# Patient Record
Sex: FEMALE | Race: WHITE
[De-identification: names, ages, dates, MRNs, and addresses within clinical notes are randomized per-mention and may not be internally consistent; named-entity substitution may affect disease eponyms.]

---

## 2018-05-10 ENCOUNTER — HOSPITAL ENCOUNTER (OUTPATIENT)
Dept: HOSPITAL 72 - SUR | Age: 53
Discharge: HOME | End: 2018-05-10
Payer: COMMERCIAL

## 2018-05-10 VITALS — SYSTOLIC BLOOD PRESSURE: 147 MMHG | DIASTOLIC BLOOD PRESSURE: 82 MMHG

## 2018-05-10 VITALS — SYSTOLIC BLOOD PRESSURE: 141 MMHG | DIASTOLIC BLOOD PRESSURE: 89 MMHG

## 2018-05-10 VITALS — SYSTOLIC BLOOD PRESSURE: 142 MMHG | DIASTOLIC BLOOD PRESSURE: 86 MMHG

## 2018-05-10 VITALS — DIASTOLIC BLOOD PRESSURE: 96 MMHG | SYSTOLIC BLOOD PRESSURE: 140 MMHG

## 2018-05-10 VITALS — BODY MASS INDEX: 19.7 KG/M2 | HEIGHT: 68 IN | WEIGHT: 130 LBS

## 2018-05-10 VITALS — DIASTOLIC BLOOD PRESSURE: 81 MMHG | SYSTOLIC BLOOD PRESSURE: 143 MMHG

## 2018-05-10 VITALS — SYSTOLIC BLOOD PRESSURE: 140 MMHG | DIASTOLIC BLOOD PRESSURE: 82 MMHG

## 2018-05-10 DIAGNOSIS — I10: ICD-10-CM

## 2018-05-10 DIAGNOSIS — Z98.82: ICD-10-CM

## 2018-05-10 DIAGNOSIS — N64.89: Primary | ICD-10-CM

## 2018-05-10 PROCEDURE — 87205 SMEAR GRAM STAIN: CPT

## 2018-05-10 PROCEDURE — 94150 VITAL CAPACITY TEST: CPT

## 2018-05-10 PROCEDURE — 87070 CULTURE OTHR SPECIMN AEROBIC: CPT

## 2018-05-10 PROCEDURE — 94003 VENT MGMT INPAT SUBQ DAY: CPT

## 2018-05-10 PROCEDURE — 87075 CULTR BACTERIA EXCEPT BLOOD: CPT

## 2018-05-10 PROCEDURE — 10140 I&D HMTMA SEROMA/FLUID COLLJ: CPT

## 2018-05-10 PROCEDURE — 87181 SC STD AGAR DILUTION PER AGT: CPT

## 2018-05-10 NOTE — IMMEDIATE POST-OP EVALUATION
Immediate Post-Op Evalulation


Immediate Post-Op Evalulation


Procedure:  L Breast Seroma Evacuation


Date of Evaluation:  May 10, 2018


Time of Evaluation:  19:20


IV Fluids:  400 LR


Blood Products:  0


Estimated Blood Loss:  20


Urinary Output:  0


Blood Pressure Systolic:  143


Blood Pressure Diastolic:  81


Pulse Rate:  70


Respiratory Rate:  16


O2 Sat by Pulse Oximetry:  100


Temperature (Fahrenheit):  97.6


Pain Score (1-10):  2


Nausea:  No


Vomiting:  No


Complications


0


Patient Status:  awake, reacts, patent, none


Hydration Status:  adequate











Rinku Chau MD May 10, 2018 18:44

## 2018-05-10 NOTE — ANETHESIA PREOPERATIVE EVAL
Anesthesia Pre-op PMH/ROS


General


Date of Evaluation:  May 10, 2018


Time of Evaluation:  17:47


Anesthesiologist:  Isac


ASA Score:  ASA 2 - Emergency


Mallampati Score


Class I : Soft palate, uvula, fauces, pillars visible


Class II: Soft palate, uvula, fauces visible


Class III: Soft palate, base of uvula visible


Class IV: Only hard plate visible


Mallampati Classification:  Class I


Surgeon:  Venu


Diagnosis:  L Breast Hematoma


Surgical Procedure:  L Breast Evacuation of Hematoma


Anesthesia History:  none


Family History:  no anesthesia problems


Medications:  see eMAR





Past Medical History


Cardiovascular:  Reports: HTN


PSxH Narrative:


B Breast Aug, Spine SX





Anesthesia Pre-op Phys. Exam


Physician Exam


156/94 p71 O2 sat 97 T98.6


Constitutional:  NAD


Neurologic:  CN 2-12 intact


Cardiovascular:  RRR


Respiratory:  CTA


Gastrointestinal:  S/NT/ND





Airway Exam


Mallampati Score:  Class I


MO:  full


ROM:  full


Teeth:  intact





Anesthesia Pre-op A/P


Risk Assessment & Plan


Assessment:


ASA 2E


Plan:


MAC


Status Change Before Surgery:  No





Pre-Antibiotics


Drug:  No Need











Rinku Chau MD May 10, 2018 18:19

## 2018-05-10 NOTE — IMMEDIATE POST-OP EVALUATION
Immediate Post-Op Evalulation


Immediate Post-Op Evalulation


Procedure:  L Breast Seroma Evacuation


Date of Evaluation:  May 10, 2018


Blood Products:  0


Pain Score (1-10):  2


Nausea:  No


Vomiting:  No


Complications


0


Patient Status:  awake, reacts, patent, none


Hydration Status:  adequate











Rinku Chau MD May 10, 2018 18:26

## 2018-05-10 NOTE — 48 HOUR POST ANESTHESIA EVAL
Post Anesthesia Evaluation


Procedure:  L Breast Seroma Evacuation


Date of Evaluation:  May 10, 2018


Time of Evaluation:  21:32


Blood Pressure Systolic:  141


0:  82


Pulse Rate:  69


Respiratory Rate:  18


Temperature (Fahrenheit):  98.2


O2 Sat by Pulse Oximetry:  99


Airway:  patent


Nausea:  No


Vomiting:  No


Pain Intensity:  2


Hydration Status:  adequate


Cardiopulmonary Status:


Stable


Mental Status/LOC:  patient returned to baseline


Follow-up Care/Observations:


0


Post-Anesthesia Complications:


0


Follow-up care needed:  ready to discharge











Rinku Chau MD May 10, 2018 18:27

## 2018-05-15 NOTE — OPERATIVE NOTE - DICTATED
DATE OF OPERATION:  05/10/2018



SURGEON:  Vazquez Lea M.D.



ASSISTANT:  None.



ANESTHESIOLOGIST:  Rinku Chau M.D.



PREOPERATIVE DIAGNOSIS:  Seroma of left breast.



POSTOPERATIVE DIAGNOSIS:  Seroma of left breast, rule out

infection.



OPERATION PERFORMED:  Evacuation of hematoma of left breast. 



PROCEDURE IN DETAIL:  The patient had breast augmentation two weeks ago

and noticed that there was an increase in the fluid.  At the time of the

original surgery, she had severe calcification of the pectoralis muscle.

The patient had the breast implants done over 20 years ago done elsewhere

and had severe encapsulation, pain, and calcification.  Part of that was

removed and sent out.  The patient understood the risks, complications,

and alternative methods of treatment.  We told her that we would make an

incision, evacuate whatever was in there, irrigate, placed Betadine, and

do suctioning with saline and then close it back up again.  That is

exactly what we did and then used 3-0 Vicryl sutures in the dermis and 4-0

Vicryl sutures in the dermis and then closed with interrupted 5-0 blue

Prolene.  The patient was then extubated and transferred to the postop

facility in a satisfactory condition.  She had this under local with

sedation, so it is considered general anesthetic.





  ______________________________________________

  Vazquez Lea M.D.



DR:  CHINMAY

D:  05/11/2018 15:12

T:  05/12/2018 02:28

JOB#:  [id number]
JENELLE